# Patient Record
(demographics unavailable — no encounter records)

---

## 2024-11-12 NOTE — REVIEW OF SYSTEMS
[Hot Flashes] : hot flashes [Recent Change In Weight] : ~T recent weight change [Insomnia] : insomnia [Anxiety] : anxiety [Negative] : Heme/Lymph [Suicidal] : not suicidal [Depression] : no depression

## 2024-11-12 NOTE — PLAN
[FreeTextEntry1] : The depression is stable off of escitalopram.  For the improved anxiety I checked I stop and change the alprazolam to 0.25 mg once daily as needed.  The lab results were reviewed with her and for the stable hyperlipidemia she will continue taking rosuvastatin 5 mg daily.  She is advised to follow-up in 6 months for a well visit and she will check labs again prior.

## 2024-11-12 NOTE — HISTORY OF PRESENT ILLNESS
[FreeTextEntry1] : pt presents for med follow up  [de-identified] : She weaned off of escitalopram and hasn't taken it since 10/22.  She is still experiencing some hot and cold flashes from stopping it but isn't feeling depressed.  She has reduced the alprazolam to 0.25 mg and she has been taking less of it.

## 2025-05-28 NOTE — HEALTH RISK ASSESSMENT
[Good] : ~his/her~  mood as  good [No falls in past year] : Patient reported no falls in the past year [0] : 2) Feeling down, depressed, or hopeless: Not at all (0) [PHQ-2 Negative - No further assessment needed] : PHQ-2 Negative - No further assessment needed [No] : does not take [Side Effects] :  side effects [Yes] : Reviewed medication list for presence of high-risk medications. [Benzodiazepines] : benzodiazepines [Opioids] : opioids [Blood Thinners] : blood thinners [Muscle Relaxants] : muscle relaxants [Sedatives] : sedatives [Former] : Former [NO] : No [Patient reported mammogram was normal] : Patient reported mammogram was normal [With Patient/Caregiver] : , with patient/caregiver [Designated Healthcare Proxy] : Designated healthcare proxy [Name: ___] : Health Care Proxy's Name: [unfilled]  [Relationship: ___] : Relationship: [unfilled] [DNR] : DNR [DNI] : DNI [Patient reported bone density results were abnormal] : Patient reported bone density results were abnormal [Patient reported colonoscopy was normal] : Patient reported colonoscopy was normal [HIV test declined] : HIV test declined [Hepatitis C test declined] : Hepatitis C test declined [None] : None [With Significant Other] : lives with significant other [Retired] : retired [Graduate School] : graduate school [] :  [Feels Safe at Home] : Feels safe at home [Fully functional (bathing, dressing, toileting, transferring, walking, feeding)] : Fully functional (bathing, dressing, toileting, transferring, walking, feeding) [Fully functional (using the telephone, shopping, preparing meals, housekeeping, doing laundry, using] : Fully functional and needs no help or supervision to perform IADLs (using the telephone, shopping, preparing meals, housekeeping, doing laundry, using transportation, managing medications and managing finances) [Reports normal functional visual acuity (ie: able to read med bottle)] : Reports normal functional visual acuity [Smoke Detector] : smoke detector [Carbon Monoxide Detector] : carbon monoxide detector [Safety elements used in home] : safety elements used in home [Seat Belt] :  uses seat belt [Sunscreen] : uses sunscreen [FreeTextEntry1] : see HPI [de-identified] : none [de-identified] : wants to find a new GYN provider, Dr Soto (GI), Dr Bond ([place]) [de-identified] : plans to resume exercising [de-identified] : healthy [de-identified] : quit at age 21 [Change in mental status noted] : No change in mental status noted [Language] : denies difficulty with language [Behavior] : denies difficulty with behavior [Learning/Retaining New Information] : denies difficulty learning/retaining new information [Handling Complex Tasks] : denies difficulty handling complex tasks [Reasoning] : denies difficulty with reasoning [Spatial Ability and Orientation] : denies difficulty with spatial ability and orientation [Reports changes in hearing] : Reports no changes in hearing [Reports changes in vision] : Reports no changes in vision [Reports changes in dental health] : Reports no changes in dental health [Travel to Developing Areas] : does not  travel to developing areas [TB Exposure] : is not being exposed to tuberculosis [Caregiver Concerns] : does not have caregiver concerns [MammogramDate] : 10/24 [BoneDensityDate] : 10/23 [BoneDensityComments] : osteopenia [ColonoscopyDate] : 2024 [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

## 2025-05-28 NOTE — HISTORY OF PRESENT ILLNESS
[de-identified] : She had to reduce the statin to every 3 days due to muscle aches.  She just rejoined the gym.  She has been feeling more anxious.  She would like a larger quantity of alprazolam.  She takes it every afternoon.

## 2025-05-28 NOTE — PLAN
[FreeTextEntry1] : The lab results were reviewed with her and she may continue taking the rosuvastatin every 3 days.  I checked I-STOP and renewed alprazolam but changed the dosing to tid prn. She may call for another rx in 3 months.  We will obtain the mammogram and colonoscopy reports.